# Patient Record
Sex: FEMALE | Race: WHITE | URBAN - METROPOLITAN AREA
[De-identification: names, ages, dates, MRNs, and addresses within clinical notes are randomized per-mention and may not be internally consistent; named-entity substitution may affect disease eponyms.]

---

## 2022-05-30 ENCOUNTER — EMERGENCY (EMERGENCY)
Facility: HOSPITAL | Age: 62
LOS: 1 days | Discharge: HOME | End: 2022-05-30
Admitting: STUDENT IN AN ORGANIZED HEALTH CARE EDUCATION/TRAINING PROGRAM
Payer: COMMERCIAL

## 2022-05-30 VITALS
OXYGEN SATURATION: 100 % | DIASTOLIC BLOOD PRESSURE: 85 MMHG | SYSTOLIC BLOOD PRESSURE: 125 MMHG | WEIGHT: 138.89 LBS | RESPIRATION RATE: 16 BRPM | HEART RATE: 92 BPM | TEMPERATURE: 98 F

## 2022-05-30 PROCEDURE — 93971 EXTREMITY STUDY: CPT | Mod: 26,LT

## 2022-05-30 PROCEDURE — 99284 EMERGENCY DEPT VISIT MOD MDM: CPT

## 2022-05-30 NOTE — ED PROVIDER NOTE - PATIENT PORTAL LINK FT
You can access the FollowMyHealth Patient Portal offered by [facility] by registering at the following website: [email or web address]. By joining [facility]’s FollowMyHealth portal, you will also be able to view your health information using other applications (apps) compatible with our system.

## 2022-05-30 NOTE — ED PROVIDER NOTE - NSTIMEPROVIDERCAREINITIATE_GEN_ER
JUNE...Called and spoke to Nicole.  A referral is needed for the echo that was preformed on 7/13/20.  I called [facility] of [facility] at [phone] and was informed they don't issue retro authorizations.     30-May-2022 14:54

## 2022-05-30 NOTE — ED ADULT TRIAGE NOTE - CHIEF COMPLAINT QUOTE
pt amb to triage c/o R leg pain/swelling x 1 week, + pedal pulse, presents w/ compression stocking to L leg, states has been exercising more recently, denies trauma to area

## 2022-05-30 NOTE — ED PROVIDER NOTE - OBJECTIVE STATEMENT
61 y/o female no pmh c/o RLE swelling x 1 week. Pt states that she has been doing more cardio exercises recently and last week while in [place] might have twisted her knee. The next day she had discomfort and started to get swelling. Pt saw her PMD wh obtained an US which was negative for DVT. Pt states that the swelling has increased since then. Pt admit sto feeling tightness but does not have much pain. Denies numbness, tingling, weakness, redness, chest pain, sob, fever or chills.